# Patient Record
Sex: MALE | URBAN - METROPOLITAN AREA
[De-identification: names, ages, dates, MRNs, and addresses within clinical notes are randomized per-mention and may not be internally consistent; named-entity substitution may affect disease eponyms.]

---

## 2024-01-30 ENCOUNTER — APPOINTMENT (RX ONLY)
Dept: URBAN - METROPOLITAN AREA CLINIC 5 | Facility: CLINIC | Age: 69
Setting detail: DERMATOLOGY
End: 2024-01-30

## 2024-01-30 DIAGNOSIS — D485 NEOPLASM OF UNCERTAIN BEHAVIOR OF SKIN: ICD-10-CM

## 2024-01-30 DIAGNOSIS — L81.4 OTHER MELANIN HYPERPIGMENTATION: ICD-10-CM

## 2024-01-30 DIAGNOSIS — D22 MELANOCYTIC NEVI: ICD-10-CM

## 2024-01-30 PROBLEM — D48.5 NEOPLASM OF UNCERTAIN BEHAVIOR OF SKIN: Status: ACTIVE | Noted: 2024-01-30

## 2024-01-30 PROBLEM — D22.9 MELANOCYTIC NEVI, UNSPECIFIED: Status: ACTIVE | Noted: 2024-01-30

## 2024-01-30 PROCEDURE — ? COUNSELING

## 2024-01-30 PROCEDURE — 99203 OFFICE O/P NEW LOW 30 MIN: CPT | Mod: 25

## 2024-01-30 PROCEDURE — ? BIOPSY BY SHAVE METHOD

## 2024-01-30 PROCEDURE — 11102 TANGNTL BX SKIN SINGLE LES: CPT

## 2024-01-30 ASSESSMENT — LOCATION DETAILED DESCRIPTION DERM: LOCATION DETAILED: LEFT NASAL ALA

## 2024-01-30 ASSESSMENT — LOCATION SIMPLE DESCRIPTION DERM: LOCATION SIMPLE: LEFT NOSE

## 2024-01-30 ASSESSMENT — LOCATION ZONE DERM: LOCATION ZONE: NOSE

## 2024-01-30 NOTE — PROCEDURE: MIPS QUALITY
Detail Level: Generalized
Quality 130: Documentation Of Current Medications In The Medical Record: Current Medications Documented
Quality 431: Preventive Care And Screening: Unhealthy Alcohol Use - Screening: Patient not identified as an unhealthy alcohol user when screened for unhealthy alcohol use using a systematic screening method
Quality 402: Tobacco Use And Help With Quitting Among Adolescents: Patient screened for tobacco and is a smoker AND received Cessation Counseling
Quality 110: Preventive Care And Screening: Influenza Immunization: Influenza Immunization not Administered for Documented Reasons.

## 2024-01-30 NOTE — PROCEDURE: BIOPSY BY SHAVE METHOD
Body Location Override (Optional - Billing Will Still Be Based On Selected Body Map Location If Applicable): left nasal ala
Detail Level: Detailed
Depth Of Biopsy: dermis
Was A Bandage Applied: Yes
Size Of Lesion In Cm: 0.7
X Size Of Lesion In Cm: 0
Biopsy Type: H and E
Biopsy Method: Dermablade
Anesthesia Type: 1% lidocaine with epinephrine
Anesthesia Volume In Cc: 1
Hemostasis: Drysol
Wound Care: Petrolatum
Dressing: bandage
Destruction After The Procedure: No
Type Of Destruction Used: Curettage
Curettage Text: The wound bed was treated with curettage after the biopsy was performed.
Cryotherapy Text: The wound bed was treated with cryotherapy after the biopsy was performed.
Electrodesiccation Text: The wound bed was treated with electrodesiccation after the biopsy was performed.
Electrodesiccation And Curettage Text: The wound bed was treated with electrodesiccation and curettage after the biopsy was performed.
Silver Nitrate Text: The wound bed was treated with silver nitrate after the biopsy was performed.
Lab: [id number]
Lab Facility: [id number]
Path Notes (To The Dermatopathologist): Size: 0.7\\nR/O: SCC vs BCC
Consent: Written consent was obtained and risks were reviewed including but not limited to scarring, infection, bleeding, scabbing, incomplete removal, nerve damage and allergy to anesthesia.
Post-Care Instructions: I reviewed with the patient in detail post-care instructions. Patient is to keep the biopsy site dry overnight, and then apply bacitracin twice daily until healed. Patient may apply hydrogen peroxide soaks to remove any crusting.
Notification Instructions: Patient will be notified of biopsy results. However, patient instructed to call the office if not contacted within 2 weeks.
Billing Type: Third-Party Bill
Information: Selecting Yes will display possible errors in your note based on the variables you have selected. This validation is only offered as a suggestion for you. PLEASE NOTE THAT THE VALIDATION TEXT WILL BE REMOVED WHEN YOU FINALIZE YOUR NOTE. IF YOU WANT TO FAX A PRELIMINARY NOTE YOU WILL NEED TO TOGGLE THIS TO 'NO' IF YOU DO NOT WANT IT IN YOUR FAXED NOTE.

## 2024-02-20 ENCOUNTER — APPOINTMENT (RX ONLY)
Dept: URBAN - METROPOLITAN AREA CLINIC 5 | Facility: CLINIC | Age: 69
Setting detail: DERMATOLOGY
End: 2024-02-20

## 2024-02-20 PROBLEM — C44.311 BASAL CELL CARCINOMA OF SKIN OF NOSE: Status: ACTIVE | Noted: 2024-02-20

## 2024-02-20 PROCEDURE — ? COUNSELING

## 2024-02-20 PROCEDURE — 99212 OFFICE O/P EST SF 10 MIN: CPT

## 2024-02-20 PROCEDURE — ? PATHOLOGY DISCUSSION

## 2024-02-27 ENCOUNTER — APPOINTMENT (RX ONLY)
Dept: URBAN - METROPOLITAN AREA CLINIC 70 | Facility: CLINIC | Age: 69
Setting detail: DERMATOLOGY
End: 2024-02-27

## 2024-02-27 PROBLEM — C44.311 BASAL CELL CARCINOMA OF SKIN OF NOSE: Status: ACTIVE | Noted: 2024-02-27

## 2024-02-27 PROCEDURE — 77290 THER RAD SIMULAJ FIELD CPLX: CPT

## 2024-02-27 PROCEDURE — ? IMAGE GUIDED - SUPERFICIAL RADIOTHERAPY: SIMULATION VISIT

## 2024-02-27 PROCEDURE — 99213 OFFICE O/P EST LOW 20 MIN: CPT

## 2024-02-27 PROCEDURE — ? THERAPEUTIC RADIOLOGY TREATMENT PLANNING

## 2024-02-27 PROCEDURE — 77261 THER RADIOLOGY TX PLNG SMPL: CPT

## 2024-02-27 PROCEDURE — ? CONSULTATION FOR RADIOTHERAPY

## 2024-03-05 ENCOUNTER — APPOINTMENT (RX ONLY)
Dept: URBAN - METROPOLITAN AREA CLINIC 70 | Facility: CLINIC | Age: 69
Setting detail: DERMATOLOGY
End: 2024-03-05

## 2024-03-05 PROBLEM — C44.311 BASAL CELL CARCINOMA OF SKIN OF NOSE: Status: ACTIVE | Noted: 2024-03-05

## 2024-03-05 PROCEDURE — ? THERAPEUTIC RADIATION SIMULATION

## 2024-03-05 PROCEDURE — 77401: CPT

## 2024-03-05 PROCEDURE — ? CPT BILLER

## 2024-03-05 PROCEDURE — ? RADIATION TREATMENT

## 2024-03-05 PROCEDURE — 77280 THER RAD SIMULAJ FIELD SMPL: CPT

## 2024-03-05 PROCEDURE — 77600 HYPERTHERMIA EXT GEN SUPFC: CPT

## 2024-03-12 ENCOUNTER — APPOINTMENT (RX ONLY)
Dept: URBAN - METROPOLITAN AREA CLINIC 70 | Facility: CLINIC | Age: 69
Setting detail: DERMATOLOGY
End: 2024-03-12

## 2024-03-12 PROBLEM — C44.311 BASAL CELL CARCINOMA OF SKIN OF NOSE: Status: ACTIVE | Noted: 2024-03-12

## 2024-03-12 PROCEDURE — ? THERAPEUTIC RADIATION SIMULATION

## 2024-03-12 PROCEDURE — ? RADIATION TREATMENT

## 2024-03-12 PROCEDURE — 77600 HYPERTHERMIA EXT GEN SUPFC: CPT

## 2024-03-12 PROCEDURE — ? CPT BILLER

## 2024-03-12 PROCEDURE — 77280 THER RAD SIMULAJ FIELD SMPL: CPT

## 2024-03-12 PROCEDURE — 77401: CPT

## 2024-03-14 ENCOUNTER — APPOINTMENT (RX ONLY)
Dept: URBAN - METROPOLITAN AREA CLINIC 70 | Facility: CLINIC | Age: 69
Setting detail: DERMATOLOGY
End: 2024-03-14

## 2024-03-14 PROBLEM — C44.311 BASAL CELL CARCINOMA OF SKIN OF NOSE: Status: ACTIVE | Noted: 2024-03-14

## 2024-03-14 PROCEDURE — 77401: CPT

## 2024-03-14 PROCEDURE — 77600 HYPERTHERMIA EXT GEN SUPFC: CPT

## 2024-03-14 PROCEDURE — ? THERAPEUTIC RADIATION SIMULATION

## 2024-03-14 PROCEDURE — 77280 THER RAD SIMULAJ FIELD SMPL: CPT

## 2024-03-14 PROCEDURE — ? CPT BILLER

## 2024-03-14 PROCEDURE — ? RADIATION TREATMENT

## 2024-03-21 ENCOUNTER — APPOINTMENT (RX ONLY)
Dept: URBAN - METROPOLITAN AREA CLINIC 70 | Facility: CLINIC | Age: 69
Setting detail: DERMATOLOGY
End: 2024-03-21

## 2024-03-21 PROBLEM — C44.311 BASAL CELL CARCINOMA OF SKIN OF NOSE: Status: ACTIVE | Noted: 2024-03-21

## 2024-03-21 PROCEDURE — 77600 HYPERTHERMIA EXT GEN SUPFC: CPT

## 2024-03-21 PROCEDURE — 77401: CPT

## 2024-03-21 PROCEDURE — ? CPT BILLER

## 2024-03-21 PROCEDURE — 77280 THER RAD SIMULAJ FIELD SMPL: CPT

## 2024-03-21 PROCEDURE — ? RADIATION TREATMENT

## 2024-03-21 PROCEDURE — ? THERAPEUTIC RADIATION SIMULATION

## 2024-03-26 ENCOUNTER — APPOINTMENT (RX ONLY)
Dept: URBAN - METROPOLITAN AREA CLINIC 70 | Facility: CLINIC | Age: 69
Setting detail: DERMATOLOGY
End: 2024-03-26

## 2024-03-26 PROBLEM — C44.311 BASAL CELL CARCINOMA OF SKIN OF NOSE: Status: ACTIVE | Noted: 2024-03-26

## 2024-03-26 PROCEDURE — ? RADIATION TREATMENT

## 2024-03-26 PROCEDURE — ? CPT BILLER

## 2024-03-26 PROCEDURE — 77600 HYPERTHERMIA EXT GEN SUPFC: CPT

## 2024-03-26 PROCEDURE — 77401: CPT

## 2024-03-26 PROCEDURE — 77280 THER RAD SIMULAJ FIELD SMPL: CPT

## 2024-03-26 PROCEDURE — ? THERAPEUTIC RADIATION SIMULATION

## 2024-03-28 ENCOUNTER — APPOINTMENT (RX ONLY)
Dept: URBAN - METROPOLITAN AREA CLINIC 70 | Facility: CLINIC | Age: 69
Setting detail: DERMATOLOGY
End: 2024-03-28

## 2024-03-28 PROBLEM — C44.311 BASAL CELL CARCINOMA OF SKIN OF NOSE: Status: ACTIVE | Noted: 2024-03-28

## 2024-03-28 PROCEDURE — ? CPT BILLER

## 2024-03-28 PROCEDURE — 77600 HYPERTHERMIA EXT GEN SUPFC: CPT

## 2024-03-28 PROCEDURE — 77280 THER RAD SIMULAJ FIELD SMPL: CPT

## 2024-03-28 PROCEDURE — ? THERAPEUTIC RADIATION SIMULATION

## 2024-03-28 PROCEDURE — 77401: CPT

## 2024-03-28 PROCEDURE — ? RADIATION TREATMENT

## 2024-04-02 ENCOUNTER — APPOINTMENT (RX ONLY)
Dept: URBAN - METROPOLITAN AREA CLINIC 70 | Facility: CLINIC | Age: 69
Setting detail: DERMATOLOGY
End: 2024-04-02

## 2024-04-02 PROBLEM — C44.311 BASAL CELL CARCINOMA OF SKIN OF NOSE: Status: ACTIVE | Noted: 2024-04-02

## 2024-04-02 PROCEDURE — ? RADIATION TREATMENT

## 2024-04-02 PROCEDURE — 77401: CPT

## 2024-04-02 PROCEDURE — ? THERAPEUTIC RADIATION SIMULATION

## 2024-04-02 PROCEDURE — 77600 HYPERTHERMIA EXT GEN SUPFC: CPT

## 2024-04-02 PROCEDURE — ? CPT BILLER

## 2024-04-02 PROCEDURE — 77280 THER RAD SIMULAJ FIELD SMPL: CPT

## 2024-04-04 ENCOUNTER — APPOINTMENT (RX ONLY)
Dept: URBAN - METROPOLITAN AREA CLINIC 70 | Facility: CLINIC | Age: 69
Setting detail: DERMATOLOGY
End: 2024-04-04

## 2024-04-04 PROBLEM — C44.311 BASAL CELL CARCINOMA OF SKIN OF NOSE: Status: ACTIVE | Noted: 2024-04-04

## 2024-04-04 PROCEDURE — 77401: CPT

## 2024-04-04 PROCEDURE — 77600 HYPERTHERMIA EXT GEN SUPFC: CPT

## 2024-04-04 PROCEDURE — 77280 THER RAD SIMULAJ FIELD SMPL: CPT

## 2024-04-04 PROCEDURE — ? CPT BILLER

## 2024-04-04 PROCEDURE — ? THERAPEUTIC RADIATION SIMULATION

## 2024-04-04 PROCEDURE — ? RADIATION TREATMENT

## 2024-04-10 ENCOUNTER — APPOINTMENT (RX ONLY)
Dept: URBAN - METROPOLITAN AREA CLINIC 70 | Facility: CLINIC | Age: 69
Setting detail: DERMATOLOGY
End: 2024-04-10

## 2024-04-10 PROBLEM — C44.311 BASAL CELL CARCINOMA OF SKIN OF NOSE: Status: ACTIVE | Noted: 2024-04-10

## 2024-04-10 PROCEDURE — ? THERAPEUTIC RADIATION SIMULATION

## 2024-04-10 PROCEDURE — ? RADIATION TREATMENT

## 2024-04-10 PROCEDURE — 77280 THER RAD SIMULAJ FIELD SMPL: CPT

## 2024-04-10 PROCEDURE — ? CPT BILLER

## 2024-04-10 PROCEDURE — 77600 HYPERTHERMIA EXT GEN SUPFC: CPT

## 2024-04-10 PROCEDURE — 77401: CPT

## 2024-04-11 ENCOUNTER — APPOINTMENT (RX ONLY)
Dept: URBAN - METROPOLITAN AREA CLINIC 70 | Facility: CLINIC | Age: 69
Setting detail: DERMATOLOGY
End: 2024-04-11

## 2024-04-11 PROBLEM — C44.311 BASAL CELL CARCINOMA OF SKIN OF NOSE: Status: ACTIVE | Noted: 2024-04-11

## 2024-04-11 PROCEDURE — 77401: CPT

## 2024-04-11 PROCEDURE — ? CPT BILLER

## 2024-04-11 PROCEDURE — 77600 HYPERTHERMIA EXT GEN SUPFC: CPT

## 2024-04-11 PROCEDURE — 77280 THER RAD SIMULAJ FIELD SMPL: CPT

## 2024-04-11 PROCEDURE — ? RADIATION TREATMENT

## 2024-04-11 PROCEDURE — ? THERAPEUTIC RADIATION SIMULATION

## 2024-04-16 ENCOUNTER — APPOINTMENT (RX ONLY)
Dept: URBAN - METROPOLITAN AREA CLINIC 70 | Facility: CLINIC | Age: 69
Setting detail: DERMATOLOGY
End: 2024-04-16

## 2024-04-16 PROBLEM — C44.311 BASAL CELL CARCINOMA OF SKIN OF NOSE: Status: ACTIVE | Noted: 2024-04-16

## 2024-04-16 PROCEDURE — ? THERAPEUTIC RADIATION SIMULATION

## 2024-04-16 PROCEDURE — 77401: CPT

## 2024-04-16 PROCEDURE — ? CPT BILLER

## 2024-04-16 PROCEDURE — ? RADIATION TREATMENT

## 2024-04-16 PROCEDURE — 77280 THER RAD SIMULAJ FIELD SMPL: CPT

## 2024-04-16 PROCEDURE — 77600 HYPERTHERMIA EXT GEN SUPFC: CPT

## 2024-04-18 ENCOUNTER — APPOINTMENT (RX ONLY)
Dept: URBAN - METROPOLITAN AREA CLINIC 70 | Facility: CLINIC | Age: 69
Setting detail: DERMATOLOGY
End: 2024-04-18

## 2024-04-18 PROBLEM — C44.311 BASAL CELL CARCINOMA OF SKIN OF NOSE: Status: ACTIVE | Noted: 2024-04-18

## 2024-04-18 PROCEDURE — 77280 THER RAD SIMULAJ FIELD SMPL: CPT

## 2024-04-18 PROCEDURE — 77401: CPT

## 2024-04-18 PROCEDURE — 77600 HYPERTHERMIA EXT GEN SUPFC: CPT

## 2024-04-18 PROCEDURE — ? THERAPEUTIC RADIATION SIMULATION

## 2024-04-18 PROCEDURE — ? RADIATION TREATMENT

## 2024-04-18 PROCEDURE — ? CPT BILLER

## 2024-04-23 ENCOUNTER — APPOINTMENT (RX ONLY)
Dept: URBAN - METROPOLITAN AREA CLINIC 70 | Facility: CLINIC | Age: 69
Setting detail: DERMATOLOGY
End: 2024-04-23

## 2024-04-23 PROBLEM — C44.91 BASAL CELL CARCINOMA OF SKIN, UNSPECIFIED: Status: ACTIVE | Noted: 2024-04-23

## 2024-04-23 PROCEDURE — ? OTHER

## 2024-04-23 PROCEDURE — ? COUNSELING

## 2024-04-23 PROCEDURE — 77427 RADIATION TX MANAGEMENT X5: CPT
